# Patient Record
Sex: FEMALE | Race: WHITE | NOT HISPANIC OR LATINO | URBAN - NONMETROPOLITAN AREA
[De-identification: names, ages, dates, MRNs, and addresses within clinical notes are randomized per-mention and may not be internally consistent; named-entity substitution may affect disease eponyms.]

---

## 2019-07-22 NOTE — PATIENT DISCUSSION
See #1-3.  NO RT/RD/ALONSO/RVO seen.  symptoms could be ophthalmic migraine.  RTC in 2 weeks for VF/exam after getting cardiology records and last carotid doper. ***Spoke with Dr. Alisha Paez office, they did not run carotid test(only echo and stress test over 1 year ago)***(Will refer pt to Dr. Moody Mckeon office for Temp artery and Carotid Doppler***. *Due to pt's NEW symptoms need to eval for possible systemic complications, bloodwork/carotid doppler/TA scan ordered**.

## 2019-07-22 NOTE — PATIENT DISCUSSION
UPDATE TO MEDICAL RECORD--AS OF 8/7/19 PT STILL REFUSING ORDERED TEST/SYSTEMIC WORK UP BY DR. Fink Push to eval poss systemic cause of ocular symptoms, Dr. Hui Valdes office attempted x 2 to schedule Temp. Artery/ophthalmic artery Doppler and Carotid Doppler but pt still refuses.  Pt has been advised of reasoning for ordering additional carotid Doppler and TA scan, advised we have received last Doppler in March but again explained pt is having new symptoms and rec repeat. and that even though no blockage seen in retina on exam could be intermittent if blood supply is compromised.  Blood work ordered showed A1C 6.0; Sed Rate 17; CRP 14.3 H.  Pt was rec to seek second opinion in timely manner if does not wish to proceed with recommended testing, again pt was advised with symptoms need to eval for possible systemic cause and should not wait.  Rec pt contact her insurance company to find local retina specialist in her network and schedule an appt.  Pt also cancelled scheduled VF OU 24-2 test and exam 8/5/19.

## 2019-07-22 NOTE — PATIENT DISCUSSION
NO sign of any retinal issues that would cause symptoms on exam today but need to eval for poss systemic cause, No RT/RD/RVO/ALONSO.  Due to pt eye pain/symptoms order blood work to further eval(CBC/CRP/[place]. Sed Rate/Hem A1C) Pt denies jaw pain or scalp tenderness.  Possible due to visual symptoms possible ocular migraine.

## 2019-07-22 NOTE — PATIENT DISCUSSION
*Due to pt's NEW symptoms need to eval for possible systemic complications, bloodwork/carotid doppler/TA scan ordered**.

## 2019-10-04 ENCOUNTER — IMPORTED ENCOUNTER (OUTPATIENT)
Dept: URBAN - NONMETROPOLITAN AREA CLINIC 1 | Facility: CLINIC | Age: 69
End: 2019-10-04

## 2019-10-04 PROBLEM — H25.813: Noted: 2019-10-04

## 2019-10-04 PROCEDURE — 92004 COMPRE OPH EXAM NEW PT 1/>: CPT

## 2019-10-04 NOTE — PATIENT DISCUSSION
Cataract(s)-Visually significant cataract OU.-Cataract(s) causing symptomatic impairment of visual function not correctable with a tolerable change in glasses or contact lenses lighting or non-operative means resulting in specific activity limitations and/or participation restrictions including but not limited to reading viewing television driving or meeting vocational or recreational needs. -Expectation is clearer vision and functional improvement in symptoms as well as reduced glare disability after cataract removal.-Order IOLMaster and OPD today. -Recommend Standard/Traditional based on today's OPD testing and lifestyle questionnaire.-All questions were answered regarding surgery including pre and post-op medications appointments activity restrictions and anesthetic usage.-The risks benefits and alternatives and special risk factors for the patient were discussed in detail including but not limited to: bleeding infection retinal detachment vitreous loss problems with the implant and possible need for additional surgery.-Although rare the possibility of complete vision loss was discussed.-The possible need for glasses post-operatively was discussed.-Order medical clearance exam based on history of high cholesterol -Patient elects to proceed with cataract surgery OD . Will schedule at patient's convenience and re-evaluate OS  in the future. Standard/ Tradtional d/t inflammationPatient to hitchcock steriod gtts 1 week before surgery and 3 weeks after surgeryWill do Posterior Synchiae LISIS Patient has

## 2019-10-31 ENCOUNTER — IMPORTED ENCOUNTER (OUTPATIENT)
Dept: URBAN - NONMETROPOLITAN AREA CLINIC 1 | Facility: CLINIC | Age: 69
End: 2019-10-31

## 2019-10-31 PROBLEM — H25.813: Noted: 2019-10-31

## 2019-11-06 ENCOUNTER — IMPORTED ENCOUNTER (OUTPATIENT)
Dept: URBAN - NONMETROPOLITAN AREA CLINIC 1 | Facility: CLINIC | Age: 69
End: 2019-11-06

## 2019-11-06 PROBLEM — Z01.818: Noted: 2019-11-06

## 2019-11-06 PROBLEM — B95.62: Noted: 2019-11-06

## 2019-11-06 PROBLEM — E78.5: Noted: 2019-11-06

## 2019-11-22 ENCOUNTER — IMPORTED ENCOUNTER (OUTPATIENT)
Dept: URBAN - NONMETROPOLITAN AREA CLINIC 1 | Facility: CLINIC | Age: 69
End: 2019-11-22

## 2019-11-22 PROBLEM — Z98.41: Noted: 2019-11-22

## 2019-11-22 PROCEDURE — 92136 OPHTHALMIC BIOMETRY: CPT

## 2019-11-22 PROCEDURE — 66984 XCAPSL CTRC RMVL W/O ECP: CPT

## 2019-11-22 PROCEDURE — 99024 POSTOP FOLLOW-UP VISIT: CPT

## 2019-11-22 NOTE — PATIENT DISCUSSION
Same day s/p PCIOL OD-Pt doing well s/p PCIOL. -Continue post-op gtts according to instruction sheet and sleep with eye shield over eye for 7 nights.-Avoid bending at the waist lifting anything over 5lbs and dirty or ronnie environments.

## 2019-11-27 ENCOUNTER — IMPORTED ENCOUNTER (OUTPATIENT)
Dept: URBAN - NONMETROPOLITAN AREA CLINIC 1 | Facility: CLINIC | Age: 69
End: 2019-11-27

## 2019-11-27 PROBLEM — Z98.41: Noted: 2019-11-27

## 2019-11-27 PROBLEM — H25.812: Noted: 2019-11-27

## 2019-11-27 PROCEDURE — 99212 OFFICE O/P EST SF 10 MIN: CPT

## 2019-11-27 PROCEDURE — 99024 POSTOP FOLLOW-UP VISIT: CPT

## 2019-11-27 NOTE — PATIENT DISCUSSION
Cataract OS-Visually significant cataract OS. -Cataract causing symptomatic impairment of visual function not correctable with a tolerable change in glasses or contact lenses lighting or non-operative means resulting in specific activity limitations and/or participation restrictions including but not limited to reading viewing television driving or meeting vocational or recreational needs. -Expectation is clearer vision and functional improvement in symptoms as well as reduced glare disability after cataract removal.-Recommend standard/traditional based on previous OPD testing and lifestyle questionnaire.-All questions were answered regarding surgery including pre and post-op medications appointments activity restrictions and anesthetic usage.-The risks benefits and alternatives and special risk factors for the patient were discussed in detail including but not limited to: bleeding infection retinal detachment vitreous loss problems with the implant and possible need for additional surgery.-Although rare the possibility of complete vision loss was discussed.-The need for glasses post-operatively was discussed.-Patient elects to proceed with cataract surgery OS. Will schedule at patient's convenience. 1 week s/p PCIOL OD-Pt doing well at 1 week s/p PCIOL OD.-Continue post-op gtts according to instruction sheet.-Okay to resume usual activities and d/c eye shield.

## 2021-11-16 NOTE — PATIENT DISCUSSION
See #1-3.  NO RT/RD/ALONSO/RVO seen.  symptoms could be ophthalmic migraine.  RTC in 2 weeks for VF/exam after getting cardiology records and last carotid doper. ***Spoke with Dr. Kvng Granger office, they did not run carotid test(only echo and stress test over 1 year ago)***(Will refer pt to Dr. Roni Powers office for Temp artery and Carotid Doppler***. *Due to pt's NEW symptoms need to eval for possible systemic complications, bloodwork/carotid doppler/TA scan ordered**.

## 2021-11-16 NOTE — PATIENT DISCUSSION
RTC in 2 weeks for VF/exam after getting cardiology records and last carotid doper. ***Spoke with Dr. Boby James office, they did not run carotid test(only echo and stress test over 1 year ago)***(Will refer pt to Dr. Rose Mary Summers office for Temp artery and Carotid Doppler***.  *Due to pt's NEW symptoms need to eval for possible systemic complications, bloodwork/carotid doppler/TA scan ordered**.

## 2021-11-16 NOTE — PATIENT DISCUSSION
UPDATE TO MEDICAL RECORD--AS OF 8/7/19 PT STILL REFUSING ORDERED TEST/SYSTEMIC WORK UP BY DR. Sandra Fofana to eval poss systemic cause of ocular symptoms, Dr. Lamont Fischer office attempted x 2 to schedule Temporal Artery/ophthalmic artery Doppler and Carotid Doppler but pt still refuses.  Pt has been advised of reasoning for ordering additional carotid Doppler and TA scan, advised we have received last Doppler in March but again explained pt is having new symptoms and rec repeat. and that even though no blockage seen in retina on exam could be intermittent if blood supply is compromised.  Blood work ordered showed A1C 6.0; Sed Rate 17; CRP 14.3 H.  Pt was rec to seek second opinion in timely manner if does not wish to proceed with recommended testing, again pt was advised with symptoms need to eval for possible systemic cause and should not wait.  Rec pt contact her insurance company to find local retina specialist in her network and schedule an appt.  Pt also cancelled scheduled VF OU 24-2 test and exam 8/5/19.

## 2022-04-09 ASSESSMENT — TONOMETRY
OS_IOP_MMHG: 14
OS_IOP_MMHG: 14
OD_IOP_MMHG: 14
OD_IOP_MMHG: 16
OD_IOP_MMHG: 14
OS_IOP_MMHG: 14

## 2022-04-09 ASSESSMENT — VISUAL ACUITY
OS_AM: 20/20
OS_CC: 20/50
OS_PH: 20/30+2
OD_PH: 20/40
OS_CC: 20/50
OD_PAM: 20/25
OS_GLARE: 20/80
OS_PH: 20/30+2
OS_AM: 20/20
OS_PH: 20/30+2
OD_GLARE: 20/60
OS_AM: 20/20
OD_CC: 20/60-2
OD_PH: 20/40
OD_GLARE: 20/60
OD_CC: 20/50
OD_CC: 20/60-2
OS_CC: 20/60
OD_CC: 20/30-
OS_CC: 20/60
OD_PAM: 20/25
OS_CC: 20/50
OD_CC: 20/70
OS_GLARE: 20/80
OS_GLARE: 20/80
OS_CC: 20/50